# Patient Record
Sex: FEMALE | URBAN - NONMETROPOLITAN AREA
[De-identification: names, ages, dates, MRNs, and addresses within clinical notes are randomized per-mention and may not be internally consistent; named-entity substitution may affect disease eponyms.]

---

## 2021-04-13 ENCOUNTER — OFFICE VISIT (OUTPATIENT)
Dept: PODIATRY | Age: 56
End: 2021-04-13
Payer: COMMERCIAL

## 2021-04-13 VITALS
SYSTOLIC BLOOD PRESSURE: 118 MMHG | DIASTOLIC BLOOD PRESSURE: 68 MMHG | HEIGHT: 65 IN | WEIGHT: 147 LBS | BODY MASS INDEX: 24.49 KG/M2 | HEART RATE: 70 BPM

## 2021-04-13 DIAGNOSIS — L60.8 DEFORMITY OF NAIL BED: Primary | ICD-10-CM

## 2021-04-13 DIAGNOSIS — M21.611 BUNION OF GREAT TOE OF RIGHT FOOT: ICD-10-CM

## 2021-04-13 PROCEDURE — 99203 OFFICE O/P NEW LOW 30 MIN: CPT | Performed by: PODIATRIST

## 2021-04-13 NOTE — PROGRESS NOTES
Subjective:  German Krishnan is a 63 y.o. female who presents to the office today complaining of bunion. Symptoms began year(s) ago. Patient relates pain is Present. Pain is rated 1 out of 10 and is described as mild. Treatments prior to today's visit include: none. Patient also complains of 1 year ago strep and Candida right big toe. Nails grow back and abnormally. No treatment tried. .  Currently denies F/C/N/V. No Known Allergies    History reviewed. No pertinent past medical history. Prior to Admission medications    Not on File       Past Surgical History:   Procedure Laterality Date    APPENDECTOMY         History reviewed. No pertinent family history. Social History     Tobacco Use    Smoking status: Never Smoker    Smokeless tobacco: Never Used   Substance Use Topics    Alcohol use: Yes     Alcohol/week: 1.0 standard drinks     Types: 1 Glasses of wine per week       ROS: All 14 ROS systems reviewed and pertinent positives noted above, all others negative. Lower Extremity Physical Examination:     Vitals:   Vitals:    04/13/21 1542   BP: 118/68   Pulse: 70     General: AAO x 3 in NAD. Vascular: DP and PT pulses palpable 2/4, bilateral.  CFT <3 seconds, bilateral.  Hair growth present to the level of the digits, bilateral.  Edema absent, bilateral.  Varicosities absent, bilateral. Erythema absent, bilateral. Distal Rubor absent bilateral.  Temperature within normal limits bilateral. Hyperpigmentation absent bilateral. No atrophic skin. Neurological: Sensation intact to light touch to level of digits, bilateral.  Protective sensation intact 10/10 sites via 5.07/10g [place] Monofilament, bilateral.  negative Tinel's, bilateral.  negative Valleix sign, bilateral.  Vibratory intact bilateral.  Reflexes Decreased bilateral.  Paresthesias negative. Dysthesias negative.   Sharp/dull intact bilateral.    Musculoskeletal: Muscle strength 5/5, bilateral.  Pain absent upon palpation bilateral. Normal medial longitudinal arch, bilateral.  Ankle ROM within normal limits,bilateral.  1st MPJ ROM within normal limits, bilateral.  Dorsally contracted digits absent. bunion R foot. Prominent medial eminence. No other foot deformities. Integument: Warm, dry, supple, bilateral.  Open lesion absent, bilateral.  Interdigital maceration absent to web spaces bilateral.   Nails left none and right 1 thickened, dystrophic and crumbly, discolored with subungual debris. R halux nail ha central transverse ridge. Fissures absent, bilateral. Hyperkeratotic tissue is absent. Asessment: Patient is a 63 y.o. female with:    Diagnosis Orders   1. Deformity of nail bed     2. Bunion of great toe of right foot         Plan: Patient examined and evaluated. Current condition and treatment options discussed in detail. Pt given tx options for anti fungal therapy. Pt educated on Rx po lamisil, Rx topical Penlac, OTC home remedies or other OTC topical meds. Bunion condition discussed with pt. Appropriate offloading pads, wider width shoe gear, and OTC anti inflammatories were all discussed. More aggressive intervention was briefly discussed and further recommendations will take place once the x rays are checked. Patient examined and evaluated. Current condition and treatment options discussed in detail. Slant back nail cut took place of  Distal R hallux nail. No need for nail procedure currently, any increase in pain of signs of infection then patient will be seen back for a nail procedure. Patient will soak qd in warm soapy water or epsom salts and will use OTC antibiotic ointment daily. Contact office with any questions/problems/concerns. Low level medical decision making this visit. Patient is acute uncomplicated condition. No new testing. Overall low risk of morbidity with current treatment course.